# Patient Record
Sex: FEMALE | Race: BLACK OR AFRICAN AMERICAN | NOT HISPANIC OR LATINO | URBAN - METROPOLITAN AREA
[De-identification: names, ages, dates, MRNs, and addresses within clinical notes are randomized per-mention and may not be internally consistent; named-entity substitution may affect disease eponyms.]

---

## 2020-01-04 ENCOUNTER — EMERGENCY (EMERGENCY)
Facility: HOSPITAL | Age: 20
LOS: 1 days | Discharge: HOME | End: 2020-01-04
Attending: EMERGENCY MEDICINE
Payer: COMMERCIAL

## 2020-01-04 VITALS
OXYGEN SATURATION: 100 % | RESPIRATION RATE: 16 BRPM | HEART RATE: 68 BPM | DIASTOLIC BLOOD PRESSURE: 83 MMHG | TEMPERATURE: 98 F | SYSTOLIC BLOOD PRESSURE: 127 MMHG

## 2020-01-04 VITALS — HEIGHT: 71 IN | WEIGHT: 293 LBS

## 2020-01-04 PROCEDURE — 99284 EMERGENCY DEPT VISIT MOD MDM: CPT | Mod: 25

## 2020-01-04 PROCEDURE — 99283 EMERGENCY DEPT VISIT LOW MDM: CPT

## 2020-01-04 PROCEDURE — 73610 X-RAY EXAM OF ANKLE: CPT

## 2020-01-04 PROCEDURE — 73620 X-RAY EXAM OF FOOT: CPT

## 2020-01-04 PROCEDURE — 73620 X-RAY EXAM OF FOOT: CPT | Mod: 26,LT

## 2020-01-04 PROCEDURE — 73610 X-RAY EXAM OF ANKLE: CPT | Mod: 26,LT

## 2020-01-04 RX ORDER — ACETAMINOPHEN 500 MG
975 TABLET ORAL ONCE
Refills: 0 | Status: COMPLETED | OUTPATIENT
Start: 2020-01-04 | End: 2020-01-04

## 2020-01-04 RX ADMIN — Medication 975 MILLIGRAM(S): at 09:06

## 2020-01-04 NOTE — ED PROVIDER NOTE - PATIENT PORTAL LINK FT
You can access the FollowMyHealth Patient Portal offered by [facility] by registering at the following website: [email or web address]. By joining [employer]’s FollowMyHealth portal, you will also be able to view your health information using other applications (apps) compatible with our system.

## 2020-01-04 NOTE — ED PROVIDER NOTE - OBJECTIVE STATEMENT
19 yr old female with no hx presents to ed c/o left ankle lateral pain x 2 days.  no fall, no twisting of ankle, no fever, no joint pain.  pt tried motrin 800mg without relieve.

## 2020-01-04 NOTE — ED PROVIDER NOTE - MUSCULOSKELETAL, MLM
left foot- ttp at sinus tarsi, mild swelling, no deformity. foot no crepitus.  pain worse with plantar flexion, neg drawer test

## 2020-01-04 NOTE — ED PROVIDER NOTE - CLINICAL SUMMARY MEDICAL DECISION MAKING FREE TEXT BOX
19 yr old female with no hx presents to ed c/o left ankle lateral pain x 2 days.  no fall, no twisting of ankle, no fever, no joint pain.  pt tried motrin 800mg without relieve.     likely ankle sprain based on exam with worse plantar flexion.  mild swelling.  RICE, xr to r/o dislocation. cane

## 2020-01-04 NOTE — ED PROVIDER NOTE - CONSTITUTIONAL, MLM
normal... Well appearing, awake, alert, oriented to person, place, time/situation and in no apparent distress. overweigh

## 2020-01-04 NOTE — ED PROVIDER NOTE - PROGRESS NOTE DETAILS
Cota: xr no fracture or dislocation.  dx ankle sprain of left. ace wrap, cane. motrin/tylenol.   rest, ace wrap, rest, physical therapy. left ambulatory.  return precautions given.

## 2022-07-22 NOTE — ED ADULT NURSE NOTE - CCCP TRG CHIEF CMPLNT
Soothing measures   For symptomatic relief of sore throat, can use over the counter cough drops such as Halls as well as mouth/throat spray such as chloraseptic that contains soothing/numbing medication. Sipping cold or warm beverages (eg, tea with honey or lemon) - Honey should be avoided in children <12 months   Eating cold or frozen desserts (eg, ice cream, popsicles). Sucking on ice. Sucking on hard candy - Hard candy is probably as effective as medicated lozenges, less expensive, and less likely to have adverse effects   Gargling with warm salt water - For children ? 10years of age and adolescents, we suggest gargling with warm salt water rather than other medicated oral rinses. Most recipes call for ¼ to ½ teaspoon of salt per 8 ounces (approximately 240 mL) of warm water. Children <6 years generally cannot gargle properly.
ankle pain/injury
